# Patient Record
Sex: MALE | Race: WHITE | NOT HISPANIC OR LATINO
[De-identification: names, ages, dates, MRNs, and addresses within clinical notes are randomized per-mention and may not be internally consistent; named-entity substitution may affect disease eponyms.]

---

## 2017-03-09 ENCOUNTER — RX ONLY (OUTPATIENT)
Age: 57
Setting detail: RX ONLY
End: 2017-03-09

## 2017-03-28 ENCOUNTER — RX ONLY (OUTPATIENT)
Age: 57
Setting detail: RX ONLY
End: 2017-03-28

## 2017-11-10 ENCOUNTER — RX ONLY (OUTPATIENT)
Age: 57
Setting detail: RX ONLY
End: 2017-11-10

## 2018-01-12 ENCOUNTER — APPOINTMENT (RX ONLY)
Dept: URBAN - METROPOLITAN AREA CLINIC 51 | Facility: CLINIC | Age: 58
Setting detail: DERMATOLOGY
End: 2018-01-12

## 2018-01-12 ENCOUNTER — APPOINTMENT (RX ONLY)
Dept: URBAN - METROPOLITAN AREA CLINIC 52 | Facility: CLINIC | Age: 58
Setting detail: DERMATOLOGY
End: 2018-01-12

## 2018-01-12 DIAGNOSIS — B35.1 TINEA UNGUIUM: ICD-10-CM | Status: IMPROVED

## 2018-01-12 PROCEDURE — ? TREATMENT REGIMEN

## 2018-01-12 PROCEDURE — 99213 OFFICE O/P EST LOW 20 MIN: CPT

## 2018-01-12 ASSESSMENT — LOCATION ZONE DERM
LOCATION ZONE: TOE
LOCATION ZONE: TOENAIL
LOCATION ZONE: TOE
LOCATION ZONE: TOENAIL

## 2018-01-12 ASSESSMENT — LOCATION DETAILED DESCRIPTION DERM
LOCATION DETAILED: LEFT 3RD TOENAIL
LOCATION DETAILED: LEFT DISTAL PLANTAR GREAT TOE
LOCATION DETAILED: LEFT 3RD TOENAIL
LOCATION DETAILED: RIGHT DORSAL GREAT TOE
LOCATION DETAILED: LEFT DISTAL PLANTAR GREAT TOE
LOCATION DETAILED: RIGHT DORSAL GREAT TOE

## 2018-01-12 ASSESSMENT — LOCATION SIMPLE DESCRIPTION DERM
LOCATION SIMPLE: RIGHT GREAT TOE
LOCATION SIMPLE: RIGHT GREAT TOE
LOCATION SIMPLE: LEFT 3RD TOE
LOCATION SIMPLE: LEFT GREAT TOE
LOCATION SIMPLE: LEFT 3RD TOE
LOCATION SIMPLE: LEFT GREAT TOE

## 2018-01-12 NOTE — HPI: INFECTION (ONYCHOMYCOSIS)
Is This A New Presentation, Or A Follow-Up?: Follow Up Onychomycosis
Additional History: 6 months of terbenafine last pill being a month ago.